# Patient Record
Sex: FEMALE | Race: WHITE | NOT HISPANIC OR LATINO | URBAN - METROPOLITAN AREA
[De-identification: names, ages, dates, MRNs, and addresses within clinical notes are randomized per-mention and may not be internally consistent; named-entity substitution may affect disease eponyms.]

---

## 2020-05-14 ENCOUNTER — NURSE TRIAGE (OUTPATIENT)
Dept: ADMINISTRATIVE | Facility: CLINIC | Age: 55
End: 2020-05-14

## 2020-05-14 NOTE — TELEPHONE ENCOUNTER
Caller c/o cough, sob, headache past 2-3 days. Reports exposure to brother  who has tested positive to CV. Request testing information.  Provided info for [facility] testing site.

## 2020-05-14 NOTE — TELEPHONE ENCOUNTER
Reason for Disposition   [1] Adult has symptoms of COVID-19 (fever, cough or SOB) AND [2] major community spread where patient lives AND [3] testing not being done for mild symptoms   [1] COVID-19 suspected (e.g., cough, fever, shortness of breath) AND [2] public health department recommends testing   [1] Fever (or feeling feverish) OR cough AND [2] within 14 Days of COVID-19 EXPOSURE (Close Contact)   [1] Fever (or feeling feverish) OR cough occurs AND [2] living in area with major community spread AND [3] testing being done in the community for symptoms   [1] Mild body aches, chills, diarrhea, headache, runny nose, or sore throat AND [2] within 14 days of COVID-19 EXPOSURE   COVID-19 Testing, questions about    Additional Information   Negative: SEVERE difficulty breathing (e.g., struggling for each breath, speak in single words, bluish lips)   Negative: Sounds like a life-threatening emergency to the triager   Negative: [1] Adult has symptoms of COVID-19 (fever, cough, or SOB) AND [2] lab test positive   Negative: SEVERE difficulty breathing (e.g., struggling for each breath, speaks in single words)   Negative: Difficult to awaken or acting confused (e.g., disoriented, slurred speech)   Negative: Bluish (or gray) lips or face now   Negative: Shock suspected (e.g., cold/pale/clammy skin, too weak to stand, low BP, rapid pulse)   Negative: Sounds like a life-threatening emergency to the triager   Negative: [1] Difficulty breathing (shortness of breath) occurs AND [2] onset > 14 days after COVID-19 EXPOSURE (Close Contact) AND [3] no major community spread   Negative: [1] Cough occurs AND [2] onset > 14 days after COVID-19 EXPOSURE AND [3] no major community spread   Negative: [1] Common cold symptoms AND [2] onset > 14 days after COVID-19 EXPOSURE AND [3] no major community spread   Negative: [1] Difficulty breathing occurs AND [2] within 14 days of COVID-19 EXPOSURE (Close Contact)   Negative:  Patient sounds very sick or weak to the triager   Negative: [1] Fever (or feeling feverish) OR cough occurs AND [2] within 14 days of travel from another country (international travel)   Negative: [1] Fever (or feeling feverish) OR cough occurs AND [2] within 14 days of travel from a city or area with major community spread   Negative: [1] COVID-19 EXPOSURE within last 14 days AND [2] NO cough, fever, or breathing difficulty AND [3] exposed person is a healthcare worker who was NOT using all recommended personal protective equipment (i.e., a respirator-N95 mask, eye protection, gloves, and gown)   Negative: [1] COVID-19 EXPOSURE (Close Contact) AND [2] within last 14 days AND [3] NO cough, fever, or breathing difficulty   Negative: [1] COVID-19 EXPOSURE (Close Contact) AND [2] 15 or more days ago AND [3] NO cough or fever or breathing difficulty   Negative: [1] No COVID-19 EXPOSURE BUT [2] living with someone who was exposed and who has no fever or cough   Negative: [1] Living in area with major community spread within last 14 days AND [2] NO cough or fever or breathing difficulty   Negative: [1] Travel from city or country with major community spread within last 14 days AND [2] NO cough or fever or breathing difficulty   Negative: [1] Caller concerned that exposure to COVID-19 occurred BUT [2] does not meet COVID-19 EXPOSURE criteria from CDC   Negative: COVID -19, questions about   Negative: COVID-19 Prevention and Healthy Living, questions about    Protocols used: CORONAVIRUS (COVID-19) EXPOSURE-A-[place], CORONAVIRUS (COVID-19) DIAGNOSED OR [id number]